# Patient Record
Sex: FEMALE | Race: WHITE | Employment: FULL TIME | URBAN - METROPOLITAN AREA
[De-identification: names, ages, dates, MRNs, and addresses within clinical notes are randomized per-mention and may not be internally consistent; named-entity substitution may affect disease eponyms.]

---

## 2018-11-13 ENCOUNTER — HOSPITAL ENCOUNTER (OUTPATIENT)
Dept: WOMENS IMAGING | Age: 53
Discharge: HOME | End: 2018-11-13
Payer: COMMERCIAL

## 2018-11-13 DIAGNOSIS — Z12.31 VISIT FOR SCREENING MAMMOGRAM: ICD-10-CM

## 2018-11-13 PROCEDURE — 77067 SCR MAMMO BI INCL CAD: CPT

## 2019-11-15 ENCOUNTER — HOSPITAL ENCOUNTER (OUTPATIENT)
Dept: WOMENS IMAGING | Age: 54
Discharge: HOME | End: 2019-11-15
Payer: COMMERCIAL

## 2019-11-15 DIAGNOSIS — Z12.31 VISIT FOR SCREENING MAMMOGRAM: ICD-10-CM

## 2019-11-15 PROCEDURE — 77063 BREAST TOMOSYNTHESIS BI: CPT

## 2020-11-20 ENCOUNTER — HOSPITAL ENCOUNTER (OUTPATIENT)
Dept: WOMENS IMAGING | Age: 55
Discharge: HOME | End: 2020-11-20
Payer: COMMERCIAL

## 2020-11-20 PROCEDURE — 77063 BREAST TOMOSYNTHESIS BI: CPT

## 2021-11-22 ENCOUNTER — HOSPITAL ENCOUNTER (OUTPATIENT)
Dept: WOMENS IMAGING | Age: 56
Discharge: HOME | End: 2021-11-22
Payer: COMMERCIAL

## 2021-11-22 DIAGNOSIS — Z12.31 BREAST CANCER SCREENING BY MAMMOGRAM: ICD-10-CM

## 2021-11-22 PROCEDURE — 77063 BREAST TOMOSYNTHESIS BI: CPT

## 2022-11-29 ENCOUNTER — HOSPITAL ENCOUNTER (OUTPATIENT)
Dept: WOMENS IMAGING | Age: 57
Discharge: HOME | End: 2022-11-29
Payer: COMMERCIAL

## 2022-11-29 DIAGNOSIS — Z12.31 BREAST CANCER SCREENING BY MAMMOGRAM: ICD-10-CM

## 2022-11-29 PROCEDURE — 77067 SCR MAMMO BI INCL CAD: CPT

## 2023-11-29 ENCOUNTER — HOSPITAL ENCOUNTER (OUTPATIENT)
Dept: WOMENS IMAGING | Age: 58
Discharge: HOME | End: 2023-11-29
Payer: COMMERCIAL

## 2023-11-29 VITALS — BODY MASS INDEX: 29.66 KG/M2 | WEIGHT: 178 LBS | HEIGHT: 65 IN

## 2023-11-29 DIAGNOSIS — Z12.31 BREAST CANCER SCREENING BY MAMMOGRAM: ICD-10-CM

## 2023-11-29 PROCEDURE — 77063 BREAST TOMOSYNTHESIS BI: CPT

## 2025-05-07 ENCOUNTER — HOSPITAL ENCOUNTER (OUTPATIENT)
Dept: WOMENS IMAGING | Age: 60
Discharge: HOME | End: 2025-05-07
Payer: COMMERCIAL

## 2025-05-07 VITALS — BODY MASS INDEX: 31.65 KG/M2 | HEIGHT: 65 IN | WEIGHT: 190 LBS

## 2025-05-07 DIAGNOSIS — Z12.31 VISIT FOR SCREENING MAMMOGRAM: ICD-10-CM

## 2025-05-07 PROCEDURE — 77063 BREAST TOMOSYNTHESIS BI: CPT

## 2025-06-13 ENCOUNTER — APPOINTMENT (OUTPATIENT)
Dept: MRI IMAGING | Age: 60
End: 2025-06-13
Payer: COMMERCIAL

## 2025-06-13 ENCOUNTER — APPOINTMENT (OUTPATIENT)
Age: 60
End: 2025-06-13
Attending: INTERNAL MEDICINE
Payer: COMMERCIAL

## 2025-06-13 ENCOUNTER — HOSPITAL ENCOUNTER (INPATIENT)
Age: 60
LOS: 1 days | Discharge: HOME | End: 2025-06-14
Attending: STUDENT IN AN ORGANIZED HEALTH CARE EDUCATION/TRAINING PROGRAM | Admitting: INTERNAL MEDICINE
Payer: COMMERCIAL

## 2025-06-13 ENCOUNTER — APPOINTMENT (OUTPATIENT)
Dept: CT IMAGING | Age: 60
End: 2025-06-13
Payer: COMMERCIAL

## 2025-06-13 DIAGNOSIS — I63.332 CEREBROVASCULAR ACCIDENT (CVA) DUE TO THROMBOSIS OF LEFT POSTERIOR CEREBRAL ARTERY (HCC): ICD-10-CM

## 2025-06-13 DIAGNOSIS — R74.01 ELEVATED LIVER TRANSAMINASE LEVEL: ICD-10-CM

## 2025-06-13 DIAGNOSIS — R79.89 ELEVATED TROPONIN: ICD-10-CM

## 2025-06-13 DIAGNOSIS — I61.9 CVA (CEREBROVASCULAR ACCIDENT DUE TO INTRACEREBRAL HEMORRHAGE) (HCC): Primary | ICD-10-CM

## 2025-06-13 DIAGNOSIS — I63.9 CEREBROVASCULAR ACCIDENT (CVA), UNSPECIFIED MECHANISM (HCC): ICD-10-CM

## 2025-06-13 LAB
ALBUMIN SERPL-MCNC: 4.1 G/DL (ref 3.4–5)
ALBUMIN/GLOB SERPL: 1.7 {RATIO} (ref 1.1–2.2)
ALP SERPL-CCNC: 78 U/L (ref 40–129)
ALT SERPL-CCNC: 48 U/L (ref 10–40)
ANION GAP SERPL CALCULATED.3IONS-SCNC: 10 MMOL/L (ref 3–16)
AST SERPL-CCNC: 59 U/L (ref 15–37)
BASOPHILS # BLD: 0.1 K/UL (ref 0–0.2)
BASOPHILS NFR BLD: 0.8 %
BILIRUB SERPL-MCNC: 1.2 MG/DL (ref 0–1)
BUN SERPL-MCNC: 14 MG/DL (ref 7–20)
CALCIUM SERPL-MCNC: 9.4 MG/DL (ref 8.3–10.6)
CHLORIDE SERPL-SCNC: 101 MMOL/L (ref 99–110)
CO2 SERPL-SCNC: 25 MMOL/L (ref 21–32)
CREAT SERPL-MCNC: 0.9 MG/DL (ref 0.6–1.2)
DEPRECATED RDW RBC AUTO: 13.9 % (ref 12.4–15.4)
ECHO AO ASC DIAM: 2.9 CM
ECHO AO ASCENDING AORTA INDEX: 1.47 CM/M2
ECHO AO ROOT DIAM: 3.1 CM
ECHO AO ROOT INDEX: 1.57 CM/M2
ECHO AV AREA PEAK VELOCITY: 2.8 CM2
ECHO AV AREA VTI: 2.8 CM2
ECHO AV AREA/BSA PEAK VELOCITY: 1.4 CM2/M2
ECHO AV AREA/BSA VTI: 1.4 CM2/M2
ECHO AV MEAN GRADIENT: 5 MMHG
ECHO AV MEAN VELOCITY: 1 M/S
ECHO AV PEAK GRADIENT: 8 MMHG
ECHO AV PEAK VELOCITY: 1.4 M/S
ECHO AV VELOCITY RATIO: 0.71
ECHO AV VTI: 29.6 CM
ECHO BSA: 2.03 M2
ECHO EST RA PRESSURE: 3 MMHG
ECHO LA AREA 2C: 16.2 CM2
ECHO LA AREA 4C: 16.8 CM2
ECHO LA MAJOR AXIS: 4.9 CM
ECHO LA MINOR AXIS: 4.9 CM
ECHO LA VOL BP: 45 ML (ref 22–52)
ECHO LA VOL MOD A2C: 43 ML (ref 22–52)
ECHO LA VOL MOD A4C: 47 ML (ref 22–52)
ECHO LA VOL/BSA BIPLANE: 23 ML/M2 (ref 16–34)
ECHO LA VOLUME INDEX MOD A2C: 22 ML/M2 (ref 16–34)
ECHO LA VOLUME INDEX MOD A4C: 24 ML/M2 (ref 16–34)
ECHO LV E' LATERAL VELOCITY: 5.84 CM/S
ECHO LV E' SEPTAL VELOCITY: 5.68 CM/S
ECHO LV EF PHYSICIAN: 62 %
ECHO LV FRACTIONAL SHORTENING: 28 % (ref 28–44)
ECHO LV INTERNAL DIMENSION DIASTOLE INDEX: 2.03 CM/M2
ECHO LV INTERNAL DIMENSION DIASTOLIC: 4 CM (ref 3.9–5.3)
ECHO LV INTERNAL DIMENSION SYSTOLIC INDEX: 1.47 CM/M2
ECHO LV INTERNAL DIMENSION SYSTOLIC: 2.9 CM
ECHO LV IVSD: 1.1 CM (ref 0.6–0.9)
ECHO LV MASS 2D: 145.6 G (ref 67–162)
ECHO LV MASS INDEX 2D: 73.9 G/M2 (ref 43–95)
ECHO LV POSTERIOR WALL DIASTOLIC: 1.1 CM (ref 0.6–0.9)
ECHO LV RELATIVE WALL THICKNESS RATIO: 0.55
ECHO LVOT AREA: 4.2 CM2
ECHO LVOT AV VTI INDEX: 0.7
ECHO LVOT DIAM: 2.3 CM
ECHO LVOT MEAN GRADIENT: 2 MMHG
ECHO LVOT PEAK GRADIENT: 4 MMHG
ECHO LVOT PEAK VELOCITY: 1 M/S
ECHO LVOT STROKE VOLUME INDEX: 43.6 ML/M2
ECHO LVOT SV: 86 ML
ECHO LVOT VTI: 20.7 CM
ECHO MV A VELOCITY: 1.04 M/S
ECHO MV AREA VTI: 3 CM2
ECHO MV E DECELERATION TIME (DT): 234 MS
ECHO MV E VELOCITY: 0.63 M/S
ECHO MV E/A RATIO: 0.61
ECHO MV E/E' LATERAL: 10.79
ECHO MV E/E' RATIO (AVERAGED): 10.94
ECHO MV E/E' SEPTAL: 11.09
ECHO MV LVOT VTI INDEX: 1.39
ECHO MV MAX VELOCITY: 1 M/S
ECHO MV MEAN GRADIENT: 1 MMHG
ECHO MV MEAN VELOCITY: 0.5 M/S
ECHO MV PEAK GRADIENT: 4 MMHG
ECHO MV VTI: 28.7 CM
ECHO PV MAX VELOCITY: 0.8 M/S
ECHO PV PEAK GRADIENT: 3 MMHG
ECHO RA AREA 4C: 14.4 CM2
ECHO RA END SYSTOLIC VOLUME APICAL 4 CHAMBER INDEX BSA: 18 ML/M2
ECHO RA VOLUME: 35 ML
ECHO RV BASAL DIMENSION: 3.2 CM
ECHO RV FREE WALL PEAK S': 17 CM/S
ECHO RV MID DIMENSION: 2.1 CM
ECHO RV TAPSE: 2.7 CM (ref 1.7–?)
EKG ATRIAL RATE: 64 BPM
EKG DIAGNOSIS: NORMAL
EKG P AXIS: 52 DEGREES
EKG P-R INTERVAL: 146 MS
EKG Q-T INTERVAL: 392 MS
EKG QRS DURATION: 78 MS
EKG QTC CALCULATION (BAZETT): 404 MS
EKG R AXIS: -22 DEGREES
EKG T AXIS: 5 DEGREES
EKG VENTRICULAR RATE: 64 BPM
EOSINOPHIL # BLD: 0 K/UL (ref 0–0.6)
EOSINOPHIL NFR BLD: 0.2 %
GFR SERPLBLD CREATININE-BSD FMLA CKD-EPI: 72 ML/MIN/{1.73_M2}
GLUCOSE BLD-MCNC: 129 MG/DL (ref 70–99)
GLUCOSE SERPL-MCNC: 132 MG/DL (ref 70–99)
HCT VFR BLD AUTO: 43.9 % (ref 36–48)
HGB BLD-MCNC: 14.7 G/DL (ref 12–16)
IMM GRANULOCYTES # BLD: 0 K/UL (ref 0–0.2)
IMM GRANULOCYTES NFR BLD: 0.2 %
INR PPP: 0.97 (ref 0.86–1.14)
LYMPHOCYTES # BLD: 0.8 K/UL (ref 1–5.1)
LYMPHOCYTES NFR BLD: 12.5 %
MCH RBC QN AUTO: 29.6 PG (ref 26–34)
MCHC RBC AUTO-ENTMCNC: 33.5 G/DL (ref 32–36.4)
MCV RBC AUTO: 88.3 FL (ref 80–100)
MONOCYTES # BLD: 0.6 K/UL (ref 0–1.3)
MONOCYTES NFR BLD: 10.5 %
NEUTROPHILS # BLD: 4.6 K/UL (ref 1.7–7.7)
NEUTROPHILS NFR BLD: 75.8 %
PERFORMED ON: ABNORMAL
PLATELET # BLD AUTO: 108 K/UL (ref 135–450)
PMV BLD AUTO: 12.2 FL (ref 5–10.5)
POTASSIUM SERPL-SCNC: 3.8 MMOL/L (ref 3.5–5.1)
PROT SERPL-MCNC: 6.5 G/DL (ref 6.4–8.2)
PROTHROMBIN TIME: 13.2 SEC (ref 12.1–14.9)
RBC # BLD AUTO: 4.97 M/UL (ref 4–5.2)
SODIUM SERPL-SCNC: 136 MMOL/L (ref 136–145)
TROPONIN, HIGH SENSITIVITY: 42 NG/L (ref 0–14)
TROPONIN, HIGH SENSITIVITY: 43 NG/L (ref 0–14)
WBC # BLD AUTO: 6 K/UL (ref 4–11)

## 2025-06-13 PROCEDURE — 80053 COMPREHEN METABOLIC PANEL: CPT

## 2025-06-13 PROCEDURE — 93010 ELECTROCARDIOGRAM REPORT: CPT | Performed by: INTERNAL MEDICINE

## 2025-06-13 PROCEDURE — 1200000000 HC SEMI PRIVATE

## 2025-06-13 PROCEDURE — A9579 GAD-BASE MR CONTRAST NOS,1ML: HCPCS | Performed by: NURSE PRACTITIONER

## 2025-06-13 PROCEDURE — 97530 THERAPEUTIC ACTIVITIES: CPT

## 2025-06-13 PROCEDURE — 93005 ELECTROCARDIOGRAM TRACING: CPT | Performed by: STUDENT IN AN ORGANIZED HEALTH CARE EDUCATION/TRAINING PROGRAM

## 2025-06-13 PROCEDURE — 93306 TTE W/DOPPLER COMPLETE: CPT

## 2025-06-13 PROCEDURE — 70553 MRI BRAIN STEM W/O & W/DYE: CPT

## 2025-06-13 PROCEDURE — 2580000003 HC RX 258: Performed by: STUDENT IN AN ORGANIZED HEALTH CARE EDUCATION/TRAINING PROGRAM

## 2025-06-13 PROCEDURE — 6360000004 HC RX CONTRAST MEDICATION: Performed by: STUDENT IN AN ORGANIZED HEALTH CARE EDUCATION/TRAINING PROGRAM

## 2025-06-13 PROCEDURE — 97161 PT EVAL LOW COMPLEX 20 MIN: CPT

## 2025-06-13 PROCEDURE — 92523 SPEECH SOUND LANG COMPREHEN: CPT

## 2025-06-13 PROCEDURE — 6370000000 HC RX 637 (ALT 250 FOR IP): Performed by: NURSE PRACTITIONER

## 2025-06-13 PROCEDURE — 2500000003 HC RX 250 WO HCPCS: Performed by: INTERNAL MEDICINE

## 2025-06-13 PROCEDURE — 97129 THER IVNTJ 1ST 15 MIN: CPT

## 2025-06-13 PROCEDURE — 6360000002 HC RX W HCPCS: Performed by: STUDENT IN AN ORGANIZED HEALTH CARE EDUCATION/TRAINING PROGRAM

## 2025-06-13 PROCEDURE — 99285 EMERGENCY DEPT VISIT HI MDM: CPT

## 2025-06-13 PROCEDURE — 84484 ASSAY OF TROPONIN QUANT: CPT

## 2025-06-13 PROCEDURE — 96365 THER/PROPH/DIAG IV INF INIT: CPT

## 2025-06-13 PROCEDURE — 6370000000 HC RX 637 (ALT 250 FOR IP): Performed by: INTERNAL MEDICINE

## 2025-06-13 PROCEDURE — 85025 COMPLETE CBC W/AUTO DIFF WBC: CPT

## 2025-06-13 PROCEDURE — 85610 PROTHROMBIN TIME: CPT

## 2025-06-13 PROCEDURE — 93306 TTE W/DOPPLER COMPLETE: CPT | Performed by: INTERNAL MEDICINE

## 2025-06-13 PROCEDURE — 6370000000 HC RX 637 (ALT 250 FOR IP)

## 2025-06-13 PROCEDURE — 70496 CT ANGIOGRAPHY HEAD: CPT

## 2025-06-13 PROCEDURE — 6360000002 HC RX W HCPCS: Performed by: INTERNAL MEDICINE

## 2025-06-13 PROCEDURE — 82947 ASSAY GLUCOSE BLOOD QUANT: CPT

## 2025-06-13 PROCEDURE — 94760 N-INVAS EAR/PLS OXIMETRY 1: CPT

## 2025-06-13 PROCEDURE — 6360000002 HC RX W HCPCS

## 2025-06-13 PROCEDURE — 97165 OT EVAL LOW COMPLEX 30 MIN: CPT

## 2025-06-13 PROCEDURE — 6370000000 HC RX 637 (ALT 250 FOR IP): Performed by: STUDENT IN AN ORGANIZED HEALTH CARE EDUCATION/TRAINING PROGRAM

## 2025-06-13 PROCEDURE — 6360000004 HC RX CONTRAST MEDICATION: Performed by: NURSE PRACTITIONER

## 2025-06-13 PROCEDURE — 70450 CT HEAD/BRAIN W/O DYE: CPT

## 2025-06-13 PROCEDURE — 36415 COLL VENOUS BLD VENIPUNCTURE: CPT

## 2025-06-13 RX ORDER — ASPIRIN 81 MG/1
TABLET, CHEWABLE ORAL
Status: COMPLETED
Start: 2025-06-13 | End: 2025-06-13

## 2025-06-13 RX ORDER — ONDANSETRON 2 MG/ML
4 INJECTION INTRAMUSCULAR; INTRAVENOUS EVERY 6 HOURS PRN
Status: DISCONTINUED | OUTPATIENT
Start: 2025-06-13 | End: 2025-06-14 | Stop reason: HOSPADM

## 2025-06-13 RX ORDER — ASPIRIN 81 MG/1
81 TABLET, CHEWABLE ORAL DAILY
Status: DISCONTINUED | OUTPATIENT
Start: 2025-06-13 | End: 2025-06-14 | Stop reason: HOSPADM

## 2025-06-13 RX ORDER — SODIUM CHLORIDE 0.9 % (FLUSH) 0.9 %
5-40 SYRINGE (ML) INJECTION EVERY 12 HOURS SCHEDULED
Status: DISCONTINUED | OUTPATIENT
Start: 2025-06-13 | End: 2025-06-14 | Stop reason: HOSPADM

## 2025-06-13 RX ORDER — MAGNESIUM SULFATE IN WATER 40 MG/ML
2000 INJECTION, SOLUTION INTRAVENOUS ONCE
Status: COMPLETED | OUTPATIENT
Start: 2025-06-13 | End: 2025-06-13

## 2025-06-13 RX ORDER — 0.9 % SODIUM CHLORIDE 0.9 %
1000 INTRAVENOUS SOLUTION INTRAVENOUS ONCE
Status: COMPLETED | OUTPATIENT
Start: 2025-06-13 | End: 2025-06-13

## 2025-06-13 RX ORDER — ENOXAPARIN SODIUM 100 MG/ML
40 INJECTION SUBCUTANEOUS DAILY
Status: DISCONTINUED | OUTPATIENT
Start: 2025-06-13 | End: 2025-06-14 | Stop reason: HOSPADM

## 2025-06-13 RX ORDER — SODIUM CHLORIDE 0.9 % (FLUSH) 0.9 %
5-40 SYRINGE (ML) INJECTION PRN
Status: DISCONTINUED | OUTPATIENT
Start: 2025-06-13 | End: 2025-06-14 | Stop reason: HOSPADM

## 2025-06-13 RX ORDER — LOSARTAN POTASSIUM 25 MG/1
25 TABLET ORAL DAILY
COMMUNITY

## 2025-06-13 RX ORDER — ACETAMINOPHEN 325 MG/1
650 TABLET ORAL ONCE
Status: COMPLETED | OUTPATIENT
Start: 2025-06-13 | End: 2025-06-13

## 2025-06-13 RX ORDER — POLYETHYLENE GLYCOL 3350 17 G/17G
17 POWDER, FOR SOLUTION ORAL DAILY PRN
Status: DISCONTINUED | OUTPATIENT
Start: 2025-06-13 | End: 2025-06-14 | Stop reason: HOSPADM

## 2025-06-13 RX ORDER — SODIUM CHLORIDE 9 MG/ML
INJECTION, SOLUTION INTRAVENOUS PRN
Status: DISCONTINUED | OUTPATIENT
Start: 2025-06-13 | End: 2025-06-14 | Stop reason: HOSPADM

## 2025-06-13 RX ORDER — ONDANSETRON 4 MG/1
4 TABLET, ORALLY DISINTEGRATING ORAL EVERY 8 HOURS PRN
Status: DISCONTINUED | OUTPATIENT
Start: 2025-06-13 | End: 2025-06-14 | Stop reason: HOSPADM

## 2025-06-13 RX ORDER — IOPAMIDOL 755 MG/ML
100 INJECTION, SOLUTION INTRAVASCULAR
Status: COMPLETED | OUTPATIENT
Start: 2025-06-13 | End: 2025-06-13

## 2025-06-13 RX ORDER — CLOPIDOGREL BISULFATE 75 MG/1
75 TABLET ORAL DAILY
Status: DISCONTINUED | OUTPATIENT
Start: 2025-06-13 | End: 2025-06-14 | Stop reason: HOSPADM

## 2025-06-13 RX ORDER — GADOTERIDOL 279.3 MG/ML
20 INJECTION INTRAVENOUS
Status: COMPLETED | OUTPATIENT
Start: 2025-06-13 | End: 2025-06-13

## 2025-06-13 RX ORDER — ATORVASTATIN CALCIUM 80 MG/1
80 TABLET, FILM COATED ORAL NIGHTLY
Status: DISCONTINUED | OUTPATIENT
Start: 2025-06-13 | End: 2025-06-14 | Stop reason: HOSPADM

## 2025-06-13 RX ORDER — LEVOTHYROXINE SODIUM 137 UG/1
137 TABLET ORAL
COMMUNITY
Start: 2024-09-03

## 2025-06-13 RX ORDER — ASPIRIN 300 MG/1
300 SUPPOSITORY RECTAL DAILY
Status: DISCONTINUED | OUTPATIENT
Start: 2025-06-13 | End: 2025-06-14 | Stop reason: HOSPADM

## 2025-06-13 RX ORDER — PRAVASTATIN SODIUM 80 MG/1
80 TABLET ORAL DAILY
Status: ON HOLD | COMMUNITY
Start: 2025-02-17 | End: 2025-06-14 | Stop reason: HOSPADM

## 2025-06-13 RX ORDER — KETOROLAC TROMETHAMINE 15 MG/ML
15 INJECTION, SOLUTION INTRAMUSCULAR; INTRAVENOUS EVERY 6 HOURS PRN
Status: DISCONTINUED | OUTPATIENT
Start: 2025-06-13 | End: 2025-06-14 | Stop reason: HOSPADM

## 2025-06-13 RX ADMIN — ASPIRIN 81 MG: 81 TABLET, CHEWABLE ORAL at 09:58

## 2025-06-13 RX ADMIN — KETOROLAC TROMETHAMINE 15 MG: 15 INJECTION, SOLUTION INTRAMUSCULAR; INTRAVENOUS at 20:34

## 2025-06-13 RX ADMIN — Medication 1 SPRAY: at 23:33

## 2025-06-13 RX ADMIN — ENOXAPARIN SODIUM 40 MG: 100 INJECTION SUBCUTANEOUS at 15:21

## 2025-06-13 RX ADMIN — SODIUM CHLORIDE, PRESERVATIVE FREE 10 ML: 5 INJECTION INTRAVENOUS at 12:00

## 2025-06-13 RX ADMIN — SODIUM CHLORIDE, PRESERVATIVE FREE 10 ML: 5 INJECTION INTRAVENOUS at 20:33

## 2025-06-13 RX ADMIN — GADOTERIDOL 19 ML: 279.3 INJECTION, SOLUTION INTRAVENOUS at 13:02

## 2025-06-13 RX ADMIN — MAGNESIUM SULFATE HEPTAHYDRATE 2000 MG: 40 INJECTION, SOLUTION INTRAVENOUS at 08:12

## 2025-06-13 RX ADMIN — SODIUM CHLORIDE 1000 ML: 0.9 INJECTION, SOLUTION INTRAVENOUS at 08:10

## 2025-06-13 RX ADMIN — ATORVASTATIN CALCIUM 80 MG: 80 TABLET, FILM COATED ORAL at 20:33

## 2025-06-13 RX ADMIN — ACETAMINOPHEN 650 MG: 325 TABLET ORAL at 08:07

## 2025-06-13 RX ADMIN — CLOPIDOGREL BISULFATE 75 MG: 75 TABLET, FILM COATED ORAL at 15:23

## 2025-06-13 RX ADMIN — IOPAMIDOL 100 ML: 755 INJECTION, SOLUTION INTRAVENOUS at 08:26

## 2025-06-13 ASSESSMENT — PAIN DESCRIPTION - DESCRIPTORS
DESCRIPTORS: ACHING

## 2025-06-13 ASSESSMENT — VISUAL ACUITY
OS: 20/30
OD: 20/25
OU: 20/25

## 2025-06-13 ASSESSMENT — PAIN DESCRIPTION - PAIN TYPE
TYPE: ACUTE PAIN

## 2025-06-13 ASSESSMENT — PAIN SCALES - GENERAL
PAINLEVEL_OUTOF10: 4
PAINLEVEL_OUTOF10: 4
PAINLEVEL_OUTOF10: 5
PAINLEVEL_OUTOF10: 4

## 2025-06-13 ASSESSMENT — PAIN DESCRIPTION - FREQUENCY
FREQUENCY: CONTINUOUS
FREQUENCY: INTERMITTENT
FREQUENCY: INTERMITTENT

## 2025-06-13 ASSESSMENT — PAIN - FUNCTIONAL ASSESSMENT
PAIN_FUNCTIONAL_ASSESSMENT: PREVENTS OR INTERFERES SOME ACTIVE ACTIVITIES AND ADLS
PAIN_FUNCTIONAL_ASSESSMENT: 0-10
PAIN_FUNCTIONAL_ASSESSMENT: PREVENTS OR INTERFERES SOME ACTIVE ACTIVITIES AND ADLS
PAIN_FUNCTIONAL_ASSESSMENT: 0-10
PAIN_FUNCTIONAL_ASSESSMENT: 0-10

## 2025-06-13 ASSESSMENT — PAIN DESCRIPTION - LOCATION
LOCATION: HEAD

## 2025-06-13 ASSESSMENT — LIFESTYLE VARIABLES: HOW OFTEN DO YOU HAVE A DRINK CONTAINING ALCOHOL: MONTHLY OR LESS

## 2025-06-13 ASSESSMENT — PAIN DESCRIPTION - ORIENTATION: ORIENTATION: MID

## 2025-06-13 NOTE — CONSULTS
Neurology Consult Note  Reason for Consult: stroke    Chief complaint: headache, vision changes, trouble comprehending    Fabiola Tang MD asked me to see Ana Cristina Arteaga in consultation for evaluation of stroke    History of Present Illness:  Ana Cristina Arteaga is a 59 y.o. female who presents with concern for stroke.     I obtained my information via interview w/ the patient, supplemented by chart review.    The patient tells me that yesterday she developed a frontal headache and some pain located behind her eyes. She does not usually get headaches.  It was constant.  She went to sleep around 2000.  When she woke up around 0700 this morning she says that she started having trouble focusing and comprehending things she was looking at or trying to read.  She could not string a sentence together.  No other focal neurologic complaints.  She ended up coming to the ED around 0730 to be evaluated.      Initial BP was 162/84.  CT head w/ newer L PCA stroke and older appearing R MCA stroke.  CTA head/neck no LVO.  No acute interventions were pursued.      Currently she is doing OK though the main issue is the reading and comprehension.    She denies any known history of stroke.  She is not on antiplatelet therapy at home.  She says she stopped smoking about a year ago though still has some cigarettes here and there.      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      Medical History:  Past Medical History:   Diagnosis Date    Hyperlipidemia     Hypertension     Lung cancer (HCC)     Thyroid disease      Past Surgical History:   Procedure Laterality Date    LUNG SEGMENTECTOMY Right 06/2024     Scheduled Meds:   sodium chloride flush  5-40 mL IntraVENous 2 times per day    enoxaparin  40 mg SubCUTAneous Daily    atorvastatin  80 mg Oral Nightly    aspirin  81 mg Oral Daily    Or    aspirin  300 mg Rectal Daily     Current Outpatient Medications   Medication Instructions  face symmetric without dysarthria  CN8: hearing grossly intact  CN11: trap full strength on shoulder shrug  CN12: tongue midline with protrusion  Strength: good strength in all 4 extremities   Sensory: light touch intact in all 4 extremities.    Cerebellar/coordination: finger nose finger normal without ataxia  Tone: normal in all 4 extremities  Gait: normal gait    Labs  Glucose 129  Na 136  K 3.8  Cl 101  BUN 14  Cr 0.9  Ca 9.4    Troponin 43    ALT 48  AST 59    WBC 6.0  Hg 14.7  Platelets 108    Studies  CT head w/o 6/13/25, independently reviewed  IMPRESSION:  1. Subtle area of hypodensity in left posterior cerebral artery territory  likely representing subacute area of ischemia.  2.  Linear area of hypodensity in the right frontal lobe that may represent a  remote area of infarct.      CTA head/neck 6/13/25, independently reviewed  1. No evidence for significant stenosis or large vessel occlusion.     Impression/Recommendations  L PCA stroke.   R MCA encephalomalacia.   Smoker.   Hypertension.   Hyperlipidemia.   Hx lung cancer s/p surgical resection.     The patient developed a HA yesterday and today some language impairment and VF deficit.      Her CT head is abnormal.  Suspect L PCA hypodensity is newer stroke and likely the cause of her symptoms.  The R MCA lesion looks more chronic.      Obtain brain MRI.  I have changed this to w/wo given lung cancer hx.    ECHO.   Lipid panel.   HgA1c.   Telemetry.     Assuming stroke, DAPT for 3 weeks then low dose asa daily indefinitely.    Statin.     Multi territory bi-hemispheric strokes would be primarily concerning for an embolic event.  She may need a cardiac event monitor at discharge pending workup and inpatient monitoring.      PT/OT evaluation.  No driving until cleared.      Neno Hopson NP  [facility] Neurology    A copy of this note was provided for Fabiola Tang MD

## 2025-06-13 NOTE — PROGRESS NOTES
Facility/Department: [address] PROGRESSIVE CARE  SLP Speech Language Cognitive Assessment     Patient: Ana Cristina Arteaga   : 1965   MRN: [id number]      Evaluation Date: 2025      Admitting Dx:   CVA (cerebrovascular accident due to intracerebral hemorrhage) (HCC) [I61.9]  Elevated troponin [R79.89]  Cerebrovascular accident (CVA) due to thrombosis of left posterior cerebral artery (HCC) [I63.332]  Cerebrovascular accident (CVA), unspecified mechanism (HCC) [I63.9]  Elevated liver transaminase level [R74.01]   has a past medical history of Hyperlipidemia, Hypertension, Lung cancer (HCC), and Thyroid disease.   has a past surgical history that includes lung segmentectomy (Right, 2024).  Allergies: allergy list reviewed  Oncology history: pt active with oncology    Prior level of function (communication, home/med/finance management, driving, etc) and living status: Lives with spouse; IND ADLs and adv ADLs; Dives; full time employment.  Regular diet consistencies at baseline  Onset: 2025     Reason for referral: SLP evaluation orders received due to CVA protocol        CURRENT ENCOUNTER DIAGNOSTICS/COURSE OF ADMISSION     H&P:   Chief Complaint:   Ana Cristina Arteaga is a 59 y.o. female with a pmh of hypothyroidism, hypertension presenting to hospital with vision change, difficulty reading and headache.  Patient started to have severe headache yesterday, mainly frontal and behind the right eye.  This morning patient also noticed some difficulty reading and vision change when she was trying to turn on her on computer.  Presented to the emergency room.  In ER blood pressure noted to be on high side at .  CT head/CTA head and neck revealed left PCA stroke and possible remote right MCA stroke.  CTA head and neck no large vessel occlusion.  Out of window of for thrombolytic therapy.  Request to be admitted to [facility] for further care.  Patient denies any fever/chills/chest  functional concrete verbal reasoning.   Pt with minimal memory deficits in recent memory/ working memory/memory with interference.   Refer to above documentation regarding visual language processing and visuo spatial organization. Suggest referral to neuro visual ophthalmology    If patient discharges prior to next visit, this note will serve as discharge.     Time code minutes:  0  Total Time:   1410 pm to 1510 pm = 60 minutes SLP evaluation and tx trial    Electronically signed by:    Blaire LeonMS,CCC,SLP [id number]  Speech and Language Pathologist  06/13/25 3:09 PM

## 2025-06-13 NOTE — PROGRESS NOTES
SLP NOTE    Evaluation attempted. Patient unavailable with PT/OT at this time. ST to re-attempt as schedule permits unless otherwise notified.    If SLP eval/tx is deemed no longer indicated as medical work-up progresses, please discontinue SLP eval/tx order.    Thank you.  Alexandrea Morris MA, CCC-SLP, #[id number]  Speech-Language Pathologist  Portable phone: [phone]

## 2025-06-13 NOTE — ED NOTES
Report to ERMIAS Kaur using SBAR including pain.  [facility] discussed.  Scoring of all screenings.

## 2025-06-13 NOTE — PROGRESS NOTES
Yes  spouse  Pain: Patient reports no pain  Pain Interventions: not applicable    Functional Mobility  Bed Mobility:  Supine to Sit: modified independent  Comments: seated EOB at end of session  Transfers:  Sit to stand transfer: Independent  Stand to sit transfer: Independent  Ambulation:  Surface:level surface  Assistive Device: no device  Assistance: Independent  Distance: 350'  Gait Mechanics: long stride length, no LOB, reports gait is at baseline  Balance:  Static Sitting Balance: good(+): independent with high level dynamic balance in unsupported position  Dynamic Sitting Balance: good(+): independent with high level dynamic balance in unsupported position  Static Standing Balance: good: independent with functional balance in unsupported position  Dynamic Standing Balance: good: independent with functional balance in unsupported position  Exercise:   No exercises performed this session.    Other activities: pt attempted to read from menu at end of session and able to read works but had difficulty and was delayed which she reports is not normal for her       Cognition  WFL  Orientation:    A&O x 4    Education  Barriers To Learning: none  Patient Education: patient educated on PT role and benefits  Learning Assessment:  Pt verbalized and demonstrates understanding    Assessment  Activity Tolerance: Excellent  Impairments Requiring Therapeutic Intervention: none - eval with same day discharge  Prognosis: good without need for therapy intervention  Safety Interventions: call light within reach, patient left in bed, nurse notified, and patient up ad argelia     Plan  Frequency: Eval with same day discharge.  No follow up required.  Current Treatment Recommendations: Not applicable, evaluation completed with same day discharge.    Goals  Patient eval with same day discharge.  No goals set as patient is at baseline mobility status.      Therapy Session Time      Individual Group Co-treatment   Time In 1131       Time  Out 1154       Minutes 23         Timed Code Treatment Minutes:  8 Minutes  Total Treatment Minutes:  23 Minutes       Minutes per charge:  Low complexity eval: 15 minutes  Therapeutic activity: 8 minutes    Electronically signed by Lanette Wayne, PT [id number] on 6/13/2025 at 12:22 PM

## 2025-06-13 NOTE — PROGRESS NOTES
Occupational Therapy        [facility] - Occupational Therapy   Phone: [phone]    Occupational Therapy  Unit:[facility] PROGRESSIVE CARE    [x] Initial Evaluation            [] Daily Treatment Note         [x] Discharge Summary      Patient: Ana Cristina Arteaga   : 1965   MRN: [id number]   Date of Service:  2025    Staff Mobility Recommendation: up ad argelia    AM-PAC Score: 24/24  Discharge Recommendations: Ana Cristina Arteaga scored a 24/24 on the AM-PAC ADL Inpatient form.  At this time, no further OT is recommended upon discharge due to patient at independent level.  Recommend patient returns to prior setting with prior services.      DME Required For Discharge: No DME required    Admitting Diagnosis:  CVA (cerebrovascular accident due to intracerebral hemorrhage) ([facility], located [facility])  Ordering Physician: Dr. Nathan  Current Admission Summary: 60 y/o female admitted 2025 with headache and intermittent hazy/blurred vision. CT head showed \"1. Subtle area of hypodensity in left posterior cerebral artery territory likely representing subacute area of ischemia. 2.  Linear area of hypodensity in the right frontal lobe that may represent a remote area of infarct.\" MRI pending    Past Medical History:  has a past medical history of Hyperlipidemia, Hypertension, Lung cancer (HCC), and Thyroid disease.  Past Surgical History:  has a past surgical history that includes lung segmentectomy (Right, 2024).    Assessment  Activity Tolerance: Excellent  Impairments Requiring Therapeutic Intervention: none - eval with same day discharge  Prognosis: good      Clinical Assessment: Patient presenting at independent level for completion of required self care tasks for return to home.  Eval with d/c at this time.  No therapy services indicated.       Restrictions:  Precautions/Restrictions: UAL  Weight Bearing Restrictions: no restrictions    Required Braces/Orthotics: no braces required  Positional Restrictions:no

## 2025-06-13 NOTE — CARE COORDINATION
Chart reviewed, no needs at this time. Patient is independent.     CM consult noted.    Please consult CM if needs arise.     Electronically signed by Bijal Smith RN on 6/13/2025 at 2:07 PM

## 2025-06-13 NOTE — H&P
V2.0  History and Physical      Name:  Ana Cristina Arteaga /Age/Sex: 1965  (59 y.o. female)   MRN & CSN:  [id number] & [id number] Encounter Date/Time: 2025 1:08 PM EDT   Location:  [id number]/[id number] PCP: Randi Glass, STEPHEN - CNP       Hospital Day: 1    Assessment and Plan:   Ana Cristina Arteaga is a 59 y.o. female with a pmh of hypothyroidism, hypertension presenting to hospital with vision change, difficulty reading and headache.      Hospital Problems           Last Modified POA    * (Principal) CVA (cerebrovascular accident due to intracerebral hemorrhage) (HCC) 2025 Yes       Plan:  Left PCA stroke, CT head showed subtle area of hypodensity in the left posterior cerebral artery territory concerning for subacute ischemia.  CTA head and neck no large vessel occlusion.  Started on aspirin, statin.  MRI brain for further evaluation.  Consult neurology.  Speech, PT OT consult.  Patient is high risk for neuro deterioration, continue frequent neurochecks.  Continue monitoring on telemetry.  Remote right MCA stroke, noted on CT scan.  No sequela per patient.  Hyperlipidemia, Lipitor ordered  Hypertension takes losartan and home, hold for now for permissive hypertension  Hypothyroidism, resume Synthroid  Mild elevation of troponin with flat trend.  EKG no acute ischemic changes.  2D echo ordered pending.  Patient denies any chest pain.  Concern for ACS is low at this point.        Disposition:   Current Living situation: From home  Expected Disposition: To home  Estimated D/C: To be determined    Diet ADULT DIET; Regular   DVT Prophylaxis [x] Lovenox, []  Heparin, [] SCDs, [] Ambulation,  [] Eliquis, [] Xarelto, [] Coumadin   Code Status Full Code         Personally reviewed Lab Studies and Imaging   EKG reviewed showing normal sinus rhythm    History from:     patient    History of Present Illness:     Chief Complaint:   Ana Cristina Arteaga is a 59 y.o. female with a pmh of hypothyroidism,  brachiocephalic or subclavian arteries. CAROTID ARTERIES: No dissection, arterial injury, or hemodynamically significant stenosis by NASCET criteria. VERTEBRAL ARTERIES: No dissection, arterial injury, or significant stenosis. SOFT TISSUES: The lung apices are clear.  No cervical or superior mediastinal lymphadenopathy.  The larynx and pharynx are unremarkable.  No acute abnormality of the salivary and thyroid glands. BONES: No acute osseous abnormality. CTA HEAD: ANTERIOR CIRCULATION: No significant stenosis of the intracranial internal carotid, anterior cerebral, or middle cerebral arteries. No aneurysm. POSTERIOR CIRCULATION: No significant stenosis of the basilar or posterior cerebral arteries. No aneurysm. OTHER: No dural venous sinus thrombosis on this non-dedicated study.     1. Subtle area of hypodensity in left posterior cerebral artery territory likely representing subacute area of ischemia. 2.  Linear area of hypodensity in the right frontal lobe that may represent a remote area of infarct. 3. No evidence for significant stenosis or large vessel occlusion.         Electronically signed by Fabiola Nathan MD on 6/13/2025 at 1:08 PM

## 2025-06-13 NOTE — ED TRIAGE NOTES
Yesterday noticed a headache around 1000.  She is having sinus congestion.  Took Ibuprofen twice yesterday.  Used a sinus nasal spray around 0400.  Yesterday it was a throbbing headache behind her eyes.  Today it is milder and up in her fore head.  No fever, vomiting or diarrhea.

## 2025-06-13 NOTE — PROGRESS NOTES
4 Eyes Skin Assessment     NAME:  Ana Cristina Arteaga  YOB: 1965  MEDICAL RECORD NUMBER:  [id number]    The patient is being assessed for  Admission    I agree that at least one RN has performed a thorough Head to Toe Skin Assessment on the patient. ALL assessment sites listed below have been assessed.      Areas assessed by both nurses:    Head, Face, Ears, Shoulders, Back, Chest, Arms, Elbows, Hands, Sacrum. Buttock, Coccyx, Ischium, Legs. Feet and Heels, and Under Medical Devices         Does the Patient have a Wound? No noted wound(s)       Dustin Prevention initiated by RN: No  Wound Care Orders initiated by RN: No    Pressure Injury (Stage 3,4, Unstageable, DTI, NWPT, and Complex wounds) if present, place Wound referral order by RN under : No    New Ostomies, if present place, Ostomy referral order under : No     Nurse 1 eSignature: Electronically signed by Natasha Calvillo RN on 6/13/25 at 3:07 PM EDT    **SHARE this note so that the co-signing nurse can place an eSignature**    Nurse 2 eSignature: Electronically signed by Jesika Raymundo RN on 6/13/25 at 6:38 PM EDT

## 2025-06-13 NOTE — ED PROVIDER NOTES
subscore is 5. GCS motor subscore is 6.      Cranial Nerves: Cranial nerves 2-12 are intact. No cranial nerve deficit, dysarthria or facial asymmetry.      Sensory: Sensation is intact. No sensory deficit.      Motor: Motor function is intact. No weakness, tremor, atrophy, abnormal muscle tone, seizure activity or pronator drift.      Coordination: Coordination is intact. Romberg sign negative. Coordination normal. Finger-Nose-Finger Test and Heel to Shin Test normal.      Gait: Gait is intact. Gait normal.      Comments: NIH stroke scale score 0  No truncal ataxia  Test of skew as performed and there was no corrective saccade seen            FORMAL DIAGNOSTIC RESULTS     EKG: All EKG's are interpreted by the Emergency Department Physician who either signs or Co-signs this chart in the absence of a cardiologist.    Sinus rhythm ventricular rate 64 MT interval 146, QRS 78, QTc 404, physiologic LAD, no clinically significant ST segment elevation or depression, T wave inversion in lead III      RADIOLOGY:   Non-plain film images such as CT, Ultrasound and MRI are read by the radiologist. Plainradiographic images are visualized and preliminarily interpreted by the  ED Provider with the belowfindings:      Interpretation per the Radiologist below, if available at the time of this note:    CTA HEAD NECK W CONTRAST   Final Result   1. Subtle area of hypodensity in left posterior cerebral artery territory   likely representing subacute area of ischemia.   2.  Linear area of hypodensity in the right frontal lobe that may represent a   remote area of infarct.   3. No evidence for significant stenosis or large vessel occlusion.         CT HEAD WO CONTRAST   Final Result   1. Subtle area of hypodensity in left posterior cerebral artery territory   likely representing subacute area of ischemia.   2.  Linear area of hypodensity in the right frontal lobe that may represent a   remote area of infarct.   3. No evidence for  elevation appears to be new as there is no prior comparison on outpatient ultrasound is likely fine easily.  She is without chest pain at this time.  EKG with no signs of ischemia or infarction.  No hypercoagulable state, no significant metabolic electro derangement, no hypoglycemia.    Repeat troponin:in process      CT head: No intracranial hemorrhage  CTA of the head and neck: Concerning findings of possible subacute infarction.  Patient will be admitted to [facility] for further management and MRI for stroke workup.  She has no large vessel occlusion thus no need for transfer to [facility] for endovascular capabilities, and she was outside the tenecteplase window      Hospitalist contacted for admission    Consults (none if blank):   Hospitalist admission    Shared Decision-Making was performed and disposition discussed with the patient and their questions were answered     Social determinants of health impacting treatment or disposition:  None      Code Status:    Full code      Vitals Reviewed:    Vitals:    06/13/25 0815 06/13/25 0845 06/13/25 0847 06/13/25 0900   BP: (!) 150/83 (!) 146/66 (!) 146/66 (!) 148/64   Pulse: 62 60 62 56   Resp: 20 17 18 17   Temp:       TempSrc:       SpO2: 95% 96% 96% 98%   Weight:       Height:           The patient was seen and examined.The results of pertinent diagnostic studies and exam findings were discussed. The patient’s provisional diagnosis and plan of care were discussed. Any medications were reviewed and indications and risks of medications were discussed with the patient.         ED Medications administered this visit:  (None if blank)  Medications   sodium chloride 0.9 % bolus 1,000 mL (1,000 mLs IntraVENous New Bag 6/13/25 0810)   magnesium sulfate 2000 mg in 50 mL IVPB premix (0 mg IntraVENous Stopped 6/13/25 0856)   acetaminophen (TYLENOL) tablet 650 mg (650 mg Oral Given 6/13/25 0807)   iopamidol (ISOVUE-370) 76 % injection 100 mL (100 mLs IntraVENous Given

## 2025-06-14 VITALS
DIASTOLIC BLOOD PRESSURE: 66 MMHG | OXYGEN SATURATION: 95 % | HEART RATE: 69 BPM | TEMPERATURE: 98.2 F | HEIGHT: 65 IN | SYSTOLIC BLOOD PRESSURE: 138 MMHG | WEIGHT: 199.3 LBS | RESPIRATION RATE: 16 BRPM | BODY MASS INDEX: 33.2 KG/M2

## 2025-06-14 LAB
ANION GAP SERPL CALCULATED.3IONS-SCNC: 9 MMOL/L (ref 3–16)
BUN SERPL-MCNC: 12 MG/DL (ref 7–20)
CALCIUM SERPL-MCNC: 8.7 MG/DL (ref 8.3–10.6)
CHLORIDE SERPL-SCNC: 101 MMOL/L (ref 99–110)
CHOLEST SERPL-MCNC: 126 MG/DL (ref 0–199)
CO2 SERPL-SCNC: 23 MMOL/L (ref 21–32)
CREAT SERPL-MCNC: 0.8 MG/DL (ref 0.6–1.2)
DEPRECATED RDW RBC AUTO: 14.8 % (ref 12.4–15.4)
EST. AVERAGE GLUCOSE BLD GHB EST-MCNC: 119.8 MG/DL
GFR SERPLBLD CREATININE-BSD FMLA CKD-EPI: 83 ML/MIN/{1.73_M2}
GLUCOSE SERPL-MCNC: 108 MG/DL (ref 70–99)
HBA1C MFR BLD: 5.8 %
HCT VFR BLD AUTO: 39.2 % (ref 36–48)
HDLC SERPL-MCNC: 31 MG/DL (ref 40–60)
HGB BLD-MCNC: 13.3 G/DL (ref 12–16)
LDLC SERPL CALC-MCNC: 62 MG/DL
MCH RBC QN AUTO: 29.2 PG (ref 26–34)
MCHC RBC AUTO-ENTMCNC: 34.1 G/DL (ref 31–36)
MCV RBC AUTO: 85.8 FL (ref 80–100)
PLATELET # BLD AUTO: 64 K/UL (ref 135–450)
PLATELET BLD QL SMEAR: ABNORMAL
PMV BLD AUTO: 9.8 FL (ref 5–10.5)
POTASSIUM SERPL-SCNC: 3.8 MMOL/L (ref 3.5–5.1)
RBC # BLD AUTO: 4.57 M/UL (ref 4–5.2)
SLIDE REVIEW: ABNORMAL
SODIUM SERPL-SCNC: 133 MMOL/L (ref 136–145)
TRIGL SERPL-MCNC: 163 MG/DL (ref 0–150)
VLDLC SERPL CALC-MCNC: 33 MG/DL
WBC # BLD AUTO: 4.4 K/UL (ref 4–11)

## 2025-06-14 PROCEDURE — 6370000000 HC RX 637 (ALT 250 FOR IP): Performed by: NURSE PRACTITIONER

## 2025-06-14 PROCEDURE — 6370000000 HC RX 637 (ALT 250 FOR IP): Performed by: INTERNAL MEDICINE

## 2025-06-14 PROCEDURE — 6370000000 HC RX 637 (ALT 250 FOR IP): Performed by: STUDENT IN AN ORGANIZED HEALTH CARE EDUCATION/TRAINING PROGRAM

## 2025-06-14 PROCEDURE — 80048 BASIC METABOLIC PNL TOTAL CA: CPT

## 2025-06-14 PROCEDURE — 83036 HEMOGLOBIN GLYCOSYLATED A1C: CPT

## 2025-06-14 PROCEDURE — 94760 N-INVAS EAR/PLS OXIMETRY 1: CPT

## 2025-06-14 PROCEDURE — 99223 1ST HOSP IP/OBS HIGH 75: CPT | Performed by: INTERNAL MEDICINE

## 2025-06-14 PROCEDURE — 2500000003 HC RX 250 WO HCPCS: Performed by: INTERNAL MEDICINE

## 2025-06-14 PROCEDURE — 85027 COMPLETE CBC AUTOMATED: CPT

## 2025-06-14 PROCEDURE — 36415 COLL VENOUS BLD VENIPUNCTURE: CPT

## 2025-06-14 PROCEDURE — 80061 LIPID PANEL: CPT

## 2025-06-14 RX ORDER — ASPIRIN 81 MG/1
81 TABLET ORAL DAILY
Qty: 30 TABLET | Refills: 0 | Status: SHIPPED | OUTPATIENT
Start: 2025-06-14

## 2025-06-14 RX ORDER — ATORVASTATIN CALCIUM 80 MG/1
80 TABLET, FILM COATED ORAL NIGHTLY
Qty: 30 TABLET | Refills: 0 | Status: SHIPPED | OUTPATIENT
Start: 2025-06-14

## 2025-06-14 RX ORDER — LOSARTAN POTASSIUM 25 MG/1
25 TABLET ORAL DAILY
Status: DISCONTINUED | OUTPATIENT
Start: 2025-06-14 | End: 2025-06-14 | Stop reason: HOSPADM

## 2025-06-14 RX ORDER — CLOPIDOGREL BISULFATE 75 MG/1
75 TABLET ORAL DAILY
Qty: 19 TABLET | Refills: 0 | Status: SHIPPED | OUTPATIENT
Start: 2025-06-15 | End: 2025-07-04

## 2025-06-14 RX ADMIN — CLOPIDOGREL BISULFATE 75 MG: 75 TABLET, FILM COATED ORAL at 09:37

## 2025-06-14 RX ADMIN — SODIUM CHLORIDE, PRESERVATIVE FREE 10 ML: 5 INJECTION INTRAVENOUS at 09:37

## 2025-06-14 RX ADMIN — ASPIRIN 81 MG: 81 TABLET, CHEWABLE ORAL at 09:37

## 2025-06-14 RX ADMIN — LEVOTHYROXINE SODIUM 137 MCG: 0.11 TABLET ORAL at 08:49

## 2025-06-14 ASSESSMENT — PAIN - FUNCTIONAL ASSESSMENT: PAIN_FUNCTIONAL_ASSESSMENT: ACTIVITIES ARE NOT PREVENTED

## 2025-06-14 ASSESSMENT — PAIN DESCRIPTION - LOCATION: LOCATION: HEAD

## 2025-06-14 ASSESSMENT — PAIN DESCRIPTION - ONSET: ONSET: ON-GOING

## 2025-06-14 ASSESSMENT — PAIN DESCRIPTION - DESCRIPTORS: DESCRIPTORS: ACHING

## 2025-06-14 ASSESSMENT — PAIN DESCRIPTION - PAIN TYPE: TYPE: ACUTE PAIN

## 2025-06-14 ASSESSMENT — PAIN DESCRIPTION - ORIENTATION: ORIENTATION: MID

## 2025-06-14 ASSESSMENT — PAIN SCALES - GENERAL
PAINLEVEL_OUTOF10: 0
PAINLEVEL_OUTOF10: 3

## 2025-06-14 ASSESSMENT — PAIN DESCRIPTION - FREQUENCY: FREQUENCY: CONTINUOUS

## 2025-06-14 NOTE — PROGRESS NOTES
Neurology Progress Note    Updates  No acute events overnight.   Sitting up in bed eating breakfast.    Feeling better overall.      Medical History:  Past Medical History:   Diagnosis Date    Hyperlipidemia     Hypertension     Lung cancer (HCC)     Thyroid disease      Past Surgical History:   Procedure Laterality Date    LUNG SEGMENTECTOMY Right 06/2024     Scheduled Meds:   levothyroxine  137 mcg Oral QAM AC    losartan  25 mg Oral Daily    sodium chloride flush  5-40 mL IntraVENous 2 times per day    [Held by provider] enoxaparin  40 mg SubCUTAneous Daily    atorvastatin  80 mg Oral Nightly    aspirin  81 mg Oral Daily    Or    aspirin  300 mg Rectal Daily    clopidogrel  75 mg Oral Daily     Current Outpatient Medications   Medication Instructions    levothyroxine (SYNTHROID) 137 mcg, DAILY BEFORE BREAKFAST    losartan (COZAAR) 25 mg, DAILY    pravastatin (PRAVACHOL) 80 mg, DAILY      Allergies   Allergen Reactions    Tetracyclines & Related Hives    Pcn [Penicillins]      History reviewed. No pertinent family history.    Social History     Tobacco Use   Smoking Status Some Days    Types: Cigarettes   Smokeless Tobacco Never     Social History     Substance and Sexual Activity   Drug Use Never     Social History     Substance and Sexual Activity   Alcohol Use None    Comment: rarely     ROS  Constitutional- No weight loss or fevers  Eyes- No diplopia. No photophobia.  Ears/nose/throat- No dysphagia. No Dysarthria  Cardiovascular- No palpitations. No chest pain  Respiratory- No dyspnea. No Cough  Gastrointestinal- No Abdominal pain. No Vomiting.  Genitourinary- No incontinence. No urinary retention  Musculoskeletal- No myalgia. No arthralgia  Skin- No rash. No easy bruising.  Psychiatric- No depression. No anxiety  Endocrine- No diabetes. No thyroid issues.  Hematologic- No bleeding difficulty. No fatigue  Neurologic- No weakness. No Headache.    Exam  Blood pressure 131/86, pulse 69, temperature 98.3 °F  Normal LA.     Impression/Recommendations  L PCA stroke.   PFO.  Chronic multi territory stroke.   Smoker.   Hypertension.   Hyperlipidemia.   Hx lung cancer s/p surgical resection.     Clinically she is doing well.     The patient has evidence of acute L PCA stroke.  Multiple other chronic infarcts are noted.  Primary concern is embolic events.     TTE reviewed.  Appears to have PFO.  LE US pending. I will consult cardiology.  She may end up being a good candidate for closure.      DAPT for 3 weeks then low dose asa indefinitely.    Statin.    Normotension.   Normoglycemia.   Telemetry.     Will need at least a 30 day event monitor if inpatient monitoring is unrevealing.    Rehabilitation efforts.  No driving until cleared.        Neno Hopson NP  [facility] Neurology    A copy of this note was provided for Damian Guzman,

## 2025-06-14 NOTE — CARE COORDINATION
CASE MANAGEMENT DISCHARGE SUMMARY:  No discharge needs    DISCHARGE DATE: 6/14/25    DISCHARGED TO HOME       Electronically signed by AJIT Campbell on 6/14/2025 at 2:13 PM

## 2025-06-14 NOTE — PLAN OF CARE
Problem: Chronic Conditions and Co-morbidities  Goal: Patient's chronic conditions and co-morbidity symptoms are monitored and maintained or improved  6/14/2025 1309 by Petty Lemon RN  Outcome: Progressing  Flowsheets (Taken 6/14/2025 1309)  Care Plan - Patient's Chronic Conditions and Co-Morbidity Symptoms are Monitored and Maintained or Improved:   Monitor and assess patient's chronic conditions and comorbid symptoms for stability, deterioration, or improvement   Collaborate with multidisciplinary team to address chronic and comorbid conditions and prevent exacerbation or deterioration   Update acute care plan with appropriate goals if chronic or comorbid symptoms are exacerbated and prevent overall improvement and discharge     Problem: Discharge Planning  Goal: Discharge to home or other facility with appropriate resources  6/14/2025 1309 by Petty Lemon RN  Outcome: Progressing  Note: Prior to admission pt was living at home with her  still working. Plan is to return to that environment.     Problem: Pain  Goal: Verbalizes/displays adequate comfort level or baseline comfort level  6/14/2025 1309 by Petty Lemon RN  Outcome: Progressing  Note: Pain/discomfort being managed with PRN analgesics per MD orders. Pt able to express presence and absence of pain and rate pain appropriately using numerical scale. Pt is able to communicate her complaints and needs.     Problem: Neurosensory - Adult  Goal: Achieves stable or improved neurological status  Outcome: Progressing  Note: Pt is awake alert and oriented able to move all extremities equally. Pt is able to see however when she is reading her speech is slower and it is not fluent. Pt still also has a dull headache. Pt is being seen by neurology.

## 2025-06-14 NOTE — PLAN OF CARE
Patient admitted for headache and blurry vision. Pt was found to be positive for stroke. Pt is being discharged today. She is to follow up with her PCP and cardiology and neurology at discharge. Pt is to have bilateral lower extremity duplex to rule out DVT as outpt.

## 2025-06-14 NOTE — CONSULTS
Referring Physician: * No referring provider recorded for this case *  Reason for Consultation: CVA with PFO/ASD  Chief Complaint: Severe headache and difficulty focusing and reading      Subjective:   History of Present Illness:  Ana Cristina Arteaga is a 59 y.o. patient who presented to the hospital with complaints of severe headache and difficulty with comprehending and reading.  She presented to the ER and she was found to have acute to subacute infarction of the left occipital lobe along with old infarction in the right parietal and bilateral cerebellar hemispheres she underwent an echocardiogram which revealed PFO/ASD leading to this cardiac consultation neurology has also seen the patient and embolic stroke has been considered    I have been asked to provide consultation regarding further management and testing.    Review of Systems:   All 12 point review of symptoms completed. Pertinent positives identified in the HPI, all other review of symptoms negative as below.    Past Medical History:   has a past medical history of Hyperlipidemia, Hypertension, Lung cancer (HCC), and Thyroid disease.    Surgical History:   has a past surgical history that includes lung segmentectomy (Right, 06/2024).     Social History:   reports that she has been smoking cigarettes. She has never used smokeless tobacco. She reports that she does not use drugs.     Family History:  family history is not on file.      Home Medications:  Were reviewed and are listed in nursing record and/or below  Prior to Admission medications    Medication Sig Start Date End Date Taking? Authorizing Provider   losartan (COZAAR) 25 MG tablet Take 1 tablet by mouth daily   Yes Marylu Houston MD   pravastatin (PRAVACHOL) 80 MG tablet Take 1 tablet by mouth daily 2/17/25  Yes Marylu Houston MD   levothyroxine (SYNTHROID) 137 MCG tablet Take 1 tablet by mouth every morning (before breakfast) 9/3/24  Yes Marylu Houston MD     Ghislaine in our office for further evaluation and treatment  - Patient is currently started on aspirin and Plavix per neurology    Hypertension  - Blood pressure is stable today      Patient is okay for discharge from cardiac standpoint    Thank you for allowing us to participate in the care of Ana Cristina Arteaga.    Teo Dao MD [facility] 6/14/2025 12:36 PM  [facility]  6/14/2025 12:36 PM

## 2025-06-14 NOTE — PROGRESS NOTES
Resting in bed. NIH is 1. Medicated this shift for reports of headache. Blood pressure stable at this time. Up to BR independently. Care ongoing.

## 2025-06-14 NOTE — PROGRESS NOTES
Discharge instructions reviewed with patient and her . Reviewed all follow up appointments. Instructed pt to inform her PCP that she needs to have bilateral lower extremity duplex study to rule out DVT. Informed her that she needs to see Dr. Scanlon for follow up on PFO. Also gave her information on calling [facility] for 30 day event monitor. Pt verbalized understanding. Also reviewed medications with patient and her . Including time of next dose and purpose of medications and possible side effects. Pt chose to ambulate to private vehicle. Safety maintained throughout stay.

## 2025-06-14 NOTE — DISCHARGE SUMMARY
V2.0  Discharge Summary    Name:  Ana Cristina Arteaga /Age/Sex: 1965 (59 y.o. female)   Admit Date: 2025  Discharge Date: 25    MRN & CSN:  [id number] & [id number] Encounter Date and Time 25 1:40 PM EDT    Attending:  Damian Crain DO Discharging Provider: Damian Crain DO       Hospital Course:     Brief HPI: Ana Cristina Arteaga is a 59 y.o. female who presented complaining of headache and hazy blurred vision.  Imaging in the ED showed concern for subacute infarct.  She was admitted with concern for acute CVA.    Brief Problem Based Course:     Acute CVA  - MRI showed acute to subacute infarction of the left septal load as well as old infarctions in the right parietal lobe and bilateral cerebellar hemispheres  - Imaging findings are concerning for embolic source of her CVA  - Transthoracic echo did show atrial shunt concerning for PFO  - Cardiology was consulted who recommended outpatient cardiac monitor to evaluate for paroxysmal atrial fibrillation  - Outpatient follow-up with structural cardiology  - Bilateral venous duplex ordered, will need outpatient follow-up with PCP for results  - Aspirin 81 mg daily  - Plavix 75 mg daily for 21 days  - Has outpatient follow-up with optometry      The patient expressed appropriate understanding of, and agreement with the discharge recommendations, medications, and plan.     Consults this admission:  IP CONSULT TO NEUROLOGY  IP CONSULT TO CASE MANAGEMENT  IP CONSULT TO CARDIOLOGY    Discharge Diagnosis:   CVA (cerebrovascular accident due to intracerebral hemorrhage) (HCC)    Discharge Instruction:   Follow up appointments: Cardiology  Primary care physician: Randi Glass, STEPHEN - CNP within 1 week  Diet: regular diet   Activity: activity as tolerated  Disposition: Discharged to:   [x]Home, []HHC, []SNF, []Acute Rehab, []Hospice   Condition on discharge: Stable  Labs and Tests to be Followed up as an outpatient by PCP or Specialist:

## 2025-06-14 NOTE — PROGRESS NOTES
Dr. Crain in to see pt. Pt expressed frustration because the bilateral lower extremity dopplers have not been done yet. Dr. Crain said he would be ok with her being discharged and getting those done outpatient but the test has to be ordered by either cardiology or neurology so if they are abnormal there is a doctor that can follow up. Pt verbalized understanding. Pt is aware that she needs to be seen by cardiology.

## 2025-06-14 NOTE — PLAN OF CARE
Problem: Chronic Conditions and Co-morbidities  Goal: Patient's chronic conditions and co-morbidity symptoms are monitored and maintained or improved  6/13/2025 2358 by Mateo Ricci RN  Outcome: Progressing     Problem: Discharge Planning  Goal: Discharge to home or other facility with appropriate resources  6/13/2025 2358 by Mateo Ricci RN  Outcome: Progressing     Problem: Pain  Goal: Verbalizes/displays adequate comfort level or baseline comfort level  6/13/2025 2358 by Mateo Ricci RN  Outcome: Progressing

## 2025-06-14 NOTE — PROGRESS NOTES
NAME:  Ana Cristina Arteaga  YOB: 1965  MEDICAL RECORD NUMBER:  [id number]  TODAYS DATE:  6/14/2025    Discussed personal risk factors for Stroke/TIA with patient/family, and ways to reduce the risk for a recurrent stroke. Patient's personal risk factors which were identified are:     [] Alcohol Abuse: check with your physician before any alcohol consumption.   [] Atrial fibrillation: may cause blood clots.  [] Drug Abuse: Seek help, talk with your doctor  [] Clotting Disorder  [] Diabetes  [] Family history of stroke or heart disease  [x] High Blood Pressure/Hypertension: work with your physician.  [x] High cholesterol: monitor cholesterol levels with your physician.   [] Overweight/Obesity: work with your physician for your ideal body weight.  [] Physical Inactivity: get regular exercise as directed by your physician.   [] Personal history of previous TIA or stroke  [] Poor Diet; decrease salt (sodium) in your diet, follow diet directed by physician.   [] Smoking: Cigarette/Cigar: stop smoking.         Advised pt. that you can reduce your risk for stroke/TIA by modifying/controlling the risk factors that you have. Pt.advised to take the medications as prescribed, which will be detailed in the discharge instructions, and to not stop taking them without consulting their physician. In addition, pt. advised to maintain a healthy diet, exercise regularly and to not smoke.      [facility]'s Stroke treatment and prevention, Managing your recovery  notebook  provided and/or reviewed  with patient/family.  The notebook includes, but not limited to, sections addressing warning signs & symptoms of a stroke, which are: sudden numbness or weakness especially on one side of the body, sudden confusion, difficulty speaking or understanding, sudden changes in vision, sudden dizziness or loss of balance/ coordination, sudden severe headache, syncope and seizure.  The need to call EMS (911) immediately if signs &  symptoms occur is emphasized . The notebook also provides education on Stroke community resources and stroke advocacy.    The need for follow-up after discharge was highlighted with patient/family with them being able to repeat understanding of the importance of this.      Electronically signed by Petty Lemon RN on 6/14/2025 at 8:15 AM

## 2025-06-16 ENCOUNTER — TELEPHONE (OUTPATIENT)
Dept: CARDIOLOGY CLINIC | Age: 60
End: 2025-06-16

## 2025-06-16 DIAGNOSIS — I61.9 CVA (CEREBROVASCULAR ACCIDENT DUE TO INTRACEREBRAL HEMORRHAGE) (HCC): Primary | ICD-10-CM

## 2025-06-16 DIAGNOSIS — Q21.12 PFO (PATENT FORAMEN OVALE): ICD-10-CM

## 2025-06-16 NOTE — TELEPHONE ENCOUNTER
Ana Cristina was seen in the hospital this past weekend,she was told to follow up with ,she states she was also told to come in this week to get a heart monitor. I dont see an order for the heart monitor. Please advise.      She can be reached at [phone].

## 2025-06-16 NOTE — TELEPHONE ENCOUNTER
Left message for patient to return call. Per discharge notes Cardiology was consulted who recommended outpatient cardiac monitor to evaluate for paroxysmal atrial fibrillation Order placed for monitor.

## 2025-06-16 NOTE — TELEPHONE ENCOUNTER
Ana Cristina returning call - she scheduled for 30 day monitor 6/18 at 930a.     She also scheduled doppler - 6/23 @ 3p

## 2025-06-23 ENCOUNTER — HOSPITAL ENCOUNTER (OUTPATIENT)
Dept: VASCULAR LAB | Age: 60
Discharge: HOME | End: 2025-06-25
Attending: STUDENT IN AN ORGANIZED HEALTH CARE EDUCATION/TRAINING PROGRAM
Payer: COMMERCIAL

## 2025-06-23 DIAGNOSIS — I63.332 CEREBROVASCULAR ACCIDENT (CVA) DUE TO THROMBOSIS OF LEFT POSTERIOR CEREBRAL ARTERY (HCC): ICD-10-CM

## 2025-06-23 LAB — VAS RIGHT CYST DIMENSIONS LENGTH: 1.77 CM

## 2025-06-23 PROCEDURE — 93970 EXTREMITY STUDY: CPT | Performed by: INTERNAL MEDICINE

## 2025-06-23 PROCEDURE — 93970 EXTREMITY STUDY: CPT

## 2025-07-11 NOTE — TELEPHONE ENCOUNTER
Called and spoke to patient to get her scheduled to be seen in the office to discuss PFO and I also let her know that hypercoag labs needed to be done. Lab orders faxed to [facility]. Patient also mentioned that she was diagnosed with TPP many years ago,

## 2025-07-11 NOTE — TELEPHONE ENCOUNTER
Patient prescribed Atorvastatin and ASA during hospitalization on 6/13/25. Pt has a follow up with Ghislaine on 7/31.  Lipid/CMP 6/13/25

## 2025-07-11 NOTE — TELEPHONE ENCOUNTER
Medication Refill    When was your last appointment with cardiology?    (If 1 yr or longer, please schedule appointment)    (If patient has been told they do not need to follow-up - medications should be filled by PCP)    When did you last have labs drawn?     Medication needing refilled?  aspirin   atorvastatin (LIPITOR)    Dosage of the medication?  81 MG EC tablet  80 MG tablet    How are you taking this medication (QD, BID, TID, QID, PRN)?  Take 1 tablet by mouth daily  Take 1 tablet by mouth nightly    Do you want a 30 or 90 day supply?  90    When will you run out of your medication?   Ana Cristina is unsure if she needs to have meds refilled.     Which Pharmacy are we sending this medication to?  [facility]/pharmacy #[id number] - [address]    Pharmacy Phone:[phone]  Pharmacy Fax:[phone]

## 2025-07-16 NOTE — TELEPHONE ENCOUNTER
Ana Cristina, called in she said she was only given scripts for 30 day supplies from the ER and she is out of medications she is said that her appointment is not until 07/31 is she suppose to stop taking the medication?    She will also need a 90 day supply on the statin for insurance to pay for it   Ana Cristina , can be reached at [phone] please

## 2025-07-17 RX ORDER — ATORVASTATIN CALCIUM 80 MG/1
80 TABLET, FILM COATED ORAL NIGHTLY
Qty: 30 TABLET | Refills: 0 | Status: SHIPPED | OUTPATIENT
Start: 2025-07-17

## 2025-07-17 RX ORDER — ASPIRIN 81 MG/1
81 TABLET ORAL DAILY
Qty: 30 TABLET | Refills: 0 | Status: SHIPPED | OUTPATIENT
Start: 2025-07-17

## 2025-07-31 ENCOUNTER — TELEPHONE (OUTPATIENT)
Dept: CARDIOLOGY CLINIC | Age: 60
End: 2025-07-31

## 2025-07-31 ENCOUNTER — OFFICE VISIT (OUTPATIENT)
Dept: CARDIOLOGY CLINIC | Age: 60
End: 2025-07-31
Payer: COMMERCIAL

## 2025-07-31 VITALS
WEIGHT: 194.4 LBS | SYSTOLIC BLOOD PRESSURE: 132 MMHG | DIASTOLIC BLOOD PRESSURE: 74 MMHG | BODY MASS INDEX: 32.35 KG/M2 | OXYGEN SATURATION: 98 % | HEART RATE: 58 BPM

## 2025-07-31 DIAGNOSIS — D69.6 THROMBOCYTOPENIA: Primary | ICD-10-CM

## 2025-07-31 PROCEDURE — 3017F COLORECTAL CA SCREEN DOC REV: CPT | Performed by: INTERNAL MEDICINE

## 2025-07-31 PROCEDURE — 1036F TOBACCO NON-USER: CPT | Performed by: INTERNAL MEDICINE

## 2025-07-31 PROCEDURE — G8417 CALC BMI ABV UP PARAM F/U: HCPCS | Performed by: INTERNAL MEDICINE

## 2025-07-31 PROCEDURE — 99214 OFFICE O/P EST MOD 30 MIN: CPT | Performed by: INTERNAL MEDICINE

## 2025-07-31 PROCEDURE — G8427 DOCREV CUR MEDS BY ELIG CLIN: HCPCS | Performed by: INTERNAL MEDICINE

## 2025-07-31 NOTE — TELEPHONE ENCOUNTER
Called and spoke to Tanika at [facility] and she has to send a message to get patient scheduled with Dr Palacios. They are going to call the patient to schedule. Provider her with the number to call patient. Also made patient aware.

## 2025-08-18 RX ORDER — ASPIRIN 81 MG/1
81 TABLET, COATED ORAL DAILY
Qty: 30 TABLET | Refills: 0 | Status: SHIPPED | OUTPATIENT
Start: 2025-08-18

## 2025-08-18 RX ORDER — ATORVASTATIN CALCIUM 80 MG/1
80 TABLET, FILM COATED ORAL NIGHTLY
Qty: 30 TABLET | Refills: 0 | Status: SHIPPED | OUTPATIENT
Start: 2025-08-18